# Patient Record
Sex: FEMALE | Race: WHITE
[De-identification: names, ages, dates, MRNs, and addresses within clinical notes are randomized per-mention and may not be internally consistent; named-entity substitution may affect disease eponyms.]

---

## 2017-02-08 ENCOUNTER — HOSPITAL ENCOUNTER (EMERGENCY)
Dept: HOSPITAL 45 - C.EDB | Age: 37
Discharge: HOME | End: 2017-02-08
Payer: COMMERCIAL

## 2017-02-08 VITALS — OXYGEN SATURATION: 100 % | HEART RATE: 95 BPM | SYSTOLIC BLOOD PRESSURE: 120 MMHG | DIASTOLIC BLOOD PRESSURE: 80 MMHG

## 2017-02-08 VITALS
HEIGHT: 62.01 IN | HEIGHT: 62.01 IN | BODY MASS INDEX: 25.28 KG/M2 | BODY MASS INDEX: 25.28 KG/M2 | WEIGHT: 139.11 LBS | WEIGHT: 139.11 LBS

## 2017-02-08 VITALS — TEMPERATURE: 97.88 F

## 2017-02-08 DIAGNOSIS — O20.0: Primary | ICD-10-CM

## 2017-02-08 DIAGNOSIS — Z3A.08: ICD-10-CM

## 2017-02-08 NOTE — EMERGENCY ROOM VISIT NOTE
History


Report prepared by Sincere:  Catherine Hart


Under the Supervision of:  Dr. Yusuf Walls D.O.


First contact with patient:  11:39


Chief Complaint:  ED VAG BLEEDING


Stated Complaint:  POSSIBLE MISCARRIAGE





History of Present Illness


The patient is a 36 year old female who presents to the Emergency Room with 

complaints of intermittent vaginal bleeding starting a few days ago. The 

patient reports some spotting. She denies any abdominal pain/cramping or heavy 

bleeding. She had taken a few urine pregnancy tests a few day ago which were 

positive. Since she started having spotting, she took 2 urine pregnancy tests 

which have been negative. Her last pregnancy test was this morning. The patient 

had nausea and vomiting last week. She denies any nausea or vomiting this week. 

The patient denies chest pain, shortness of breath, or any other complaints. 

She thinks she is 8 weeks pregnant. This is her second pregnancy. She has 1 

child. Her blood type is O positive. She has an appointment with her OB-GYN on 

February 13.





   Source of History:  patient


   Onset:  a few days ago


   Position:  other (global)


   Quality:  other (vaginal bleeding)


   Timing:  intermittent


   Associated Symptoms:  No SOB, No abdominal pain, No chest pain, No nausea, 

No vomiting





Review of Systems


See HPI for pertinent positives & negatives. A total of 10 systems reviewed and 

were otherwise negative.





Past Medical & Surgical


Medical Problems:


(1) Pregnancy








Family History





Patient reports no known family medical history.





Social History


Smoking Status:  Never Smoker


Marital Status:  


Housing Status:  lives with family





Current/Historical Medications


Scheduled


Multivit/Min/Iron/Fol Ac/Pren (Prenatal Vitamin), 1 TAB PO DAILY





Allergies


Coded Allergies:  


     No Known Allergies (Unverified , 2/8/17)





Physical Exam


Vital Signs











  Date Time  Temp Pulse Resp B/P Pulse Ox O2 Delivery O2 Flow Rate FiO2


 


2/8/17 12:23  95 16 120/80 100   


 


2/8/17 11:18 36.6 92 18 129/82 100   











Physical Exam


GENERAL:  Patient is awake, alert, somewhat anxious appearing but comfortable, 

does not appear to be in any pain. 


EYES: The conjunctivae are clear.  The pupils are round and reactive. 


EARS, NOSE, MOUTH AND THROAT: The nose is without any evidence of any 

deformity. Mucous membranes are moist tongue is midline 


NECK: The neck is nontender and supple.


RESPIRATORY: Normal respiratory effort is noted there is no evidence of 

wheezing rhonchi or rales


CARDIOVASCULAR:  Regular rate and rhythm noted there no murmurs rubs or gallops 

normal S1 normal S2 


GASTROINTESTINAL: The abdomen is soft. Bowel sounds are present in all 

quadrants. Abdomen is nontender


MUSCULOSKELETAL/EXTREMITIES: There is no evidence of gross deformity full range 

of motion is noted in the hips and shoulders


SKIN: There is no obvious evidence of any rash. There are no petechiae, pallor 

or cyanosis noted. 


NEUROLOGIC:  Patient is awake alert and oriented x3





Medical Decision & Procedures


Procedure


Bedside ultrasound was done. There was a gestational sac noted in the uterus 

directly midline. Fetal heart rate was noted visually as well as by Doppler.





ED Course


1139: The patient was evaluated in room C07. A complete history and physical 

examination were performed. 





1216: Upon reevaluation, the patient is resting comfortably. I discussed the 

results and treatment plan with her. She verbalized agreement of the treatment 

plan. She was discharged home.





Medical Decision


Prior records/ancillary studies reviewed.


Triage Nursing notes reviewed.





Differential diagnosis:


Etiologies such as ectopic pregnancy, dysfunction uterine bleeding, bleeding 

dyscrasia, trauma, infection, as well as others were entertained.





The patient is a 36-year-old female who presented to the emergency department 

for an evaluation of possible miscarriage. The patient had vaginal spotting and 

states that she was having some cramping. She took a home pregnancy test 

recently which was positive but then took a home pregnancy test that she states 

was negative. The patient was very concerned that she was having a miscarriage 

and was very tearful and emotional. Bedside ultrasound did reveal an 

intrauterine gestational sac with a fetal heart rate that was noted on 

visualization as well as Doppler. The patient was very happy to see this. I 

offered to do further laboratory and radiographic studies such as a formal 

ultrasound and beta hCG but the patient did not wish to have that at this time. 

She states that she will follow-up with her primary OB/GYN physician. I 

discussed with her the possibility of an ectopic pregnancy as well. She was 

encouraged to follow-up with her OB/GYN physician as soon as possible return to 

the emergency Department immediately if she develops any symptoms which could 

be consistent with ectopic such as dizziness upon standing heavy bleeding 

severe pain severe nausea vomiting or if need arises.





Impression





 Primary Impression:  


 Threatened miscarriage





Scribe Attestation


The scribe's documentation has been prepared under my direction and personally 

reviewed by me in its entirety. I confirm that the note above accurately 

reflects all work, treatment, procedures, and medical decision making performed 

by me.





Departure Information


Dispostion


Home / Self-Care





Referrals


No Doctor, Assigned (PCP)








Jennifer Galvan M.D.





Forms


HOME CARE DOCUMENTATION FORM,                                                 

               IMPORTANT VISIT INFORMATION, WORK / SCHOOL INSTRUCTIONS





Patient Instructions


ED Miscarriage Poss, [facility]





Additional Instructions





Follow-up with your OB/GYN physician as scheduled. Rest and avoid any strenuous 

activity. Return to the emergency department if you develop worrisome symptoms 

of ectopic pregnancy such as severe bleeding severe pain dizziness upon 

standing or if any other worrisome symptoms develop.

## 2017-02-13 ENCOUNTER — HOSPITAL ENCOUNTER (OUTPATIENT)
Dept: HOSPITAL 45 - C.LABSPEC | Age: 37
Discharge: HOME | End: 2017-02-13
Attending: OBSTETRICS & GYNECOLOGY
Payer: COMMERCIAL

## 2017-02-13 DIAGNOSIS — Z34.90: Primary | ICD-10-CM

## 2017-02-13 LAB
APPEARANCE UR: CLEAR
BILIRUB UR-MCNC: (no result) MG/DL
COLOR UR: YELLOW
MANUAL MICROSCOPIC REQUIRED?: NO
NITRITE UR QL STRIP: (no result)
PH UR STRIP: 5.5 [PH] (ref 4.5–7.5)
REVIEW REQ?: NO
SP GR UR STRIP: 1.02 (ref 1–1.03)
URINE BILL WITH OR WITHOUT MIC: (no result)
UROBILINOGEN UR-MCNC: (no result) MG/DL

## 2017-02-17 ENCOUNTER — HOSPITAL ENCOUNTER (OUTPATIENT)
Dept: HOSPITAL 45 - C.PAPS | Age: 37
Discharge: HOME | End: 2017-02-17
Attending: OBSTETRICS & GYNECOLOGY
Payer: COMMERCIAL

## 2017-02-17 ENCOUNTER — HOSPITAL ENCOUNTER (OUTPATIENT)
Dept: HOSPITAL 45 - C.LAB1850 | Age: 37
Discharge: HOME | End: 2017-02-17
Attending: OBSTETRICS & GYNECOLOGY
Payer: COMMERCIAL

## 2017-02-17 DIAGNOSIS — Z34.90: Primary | ICD-10-CM

## 2017-02-17 LAB
BASOPHILS # BLD: 0.05 K/UL (ref 0–0.2)
BASOPHILS NFR BLD: 0.8 %
COMPLETE: YES
EOSINOPHIL NFR BLD AUTO: 368 K/UL (ref 130–400)
HCT VFR BLD CALC: 37.5 % (ref 37–47)
IG%: 0.3 %
IMM GRANULOCYTES NFR BLD AUTO: 27.5 %
LYMPHOCYTES # BLD: 1.8 K/UL (ref 1.2–3.4)
MCH RBC QN AUTO: 30.9 PG (ref 25–34)
MCHC RBC AUTO-ENTMCNC: 34.7 G/DL (ref 32–36)
MCV RBC AUTO: 89.1 FL (ref 80–100)
MONOCYTES NFR BLD: 8.4 %
NEUTROPHILS # BLD AUTO: 7 %
NEUTROPHILS NFR BLD AUTO: 56 %
PMV BLD AUTO: 9.9 FL (ref 7.4–10.4)
RBC # BLD AUTO: 4.21 M/UL (ref 4.2–5.4)
WBC # BLD AUTO: 6.54 K/UL (ref 4.8–10.8)

## 2017-02-21 LAB
CHLAMYDIA TRACH RNA***: NOT DETECTED
GC (NEIS GONORRHOEAE)RNA**: NOT DETECTED

## 2017-04-14 ENCOUNTER — HOSPITAL ENCOUNTER (OUTPATIENT)
Dept: HOSPITAL 45 - C.LAB1850 | Age: 37
Discharge: HOME | End: 2017-04-14
Attending: OBSTETRICS & GYNECOLOGY
Payer: COMMERCIAL

## 2017-04-14 DIAGNOSIS — O09.522: Primary | ICD-10-CM

## 2017-04-14 LAB — GTGD: 50 GRAMS

## 2017-04-17 LAB
AFP CONCENTRATION: 35.2 NG/ML
AFP MSS INTERPRETATION: (no result)
AFP MULTIPLE OF MEDIAN: 1.04
AFPTS COMMENT: (no result)
AFPTS GESTATIONAL AGE: 16.3 WEEKS
AFPTS INSULIN DEP DIABETIC?: NO
AFPTS MATERNAL WT: 150 LBS
ALPHA-FETOPROTEIN RACE: (no result)
EDD DETERMINED BY: (no result)
HISTORY OF NTD: NO
REPEAT SAMPLE?: NO

## 2017-07-10 ENCOUNTER — HOSPITAL ENCOUNTER (OUTPATIENT)
Dept: HOSPITAL 45 - C.LAB1850 | Age: 37
Discharge: HOME | End: 2017-07-10
Attending: OBSTETRICS & GYNECOLOGY
Payer: COMMERCIAL

## 2017-07-10 DIAGNOSIS — O09.523: Primary | ICD-10-CM

## 2017-07-10 LAB
APPEARANCE UR: CLEAR
BILIRUB UR-MCNC: (no result) MG/DL
COLOR UR: YELLOW
GTGD: 50 GRAMS
HCT VFR BLD CALC: 33.8 % (ref 37–47)
MANUAL MICROSCOPIC REQUIRED?: NO
NITRITE UR QL STRIP: (no result)
PH UR STRIP: 7 [PH] (ref 4.5–7.5)
REVIEW REQ?: NO
SP GR UR STRIP: 1.02 (ref 1–1.03)
URINE EPITHELIAL CELL AUTO: >30 /LPF (ref 0–5)
UROBILINOGEN UR-MCNC: (no result) MG/DL

## 2017-08-10 ENCOUNTER — HOSPITAL ENCOUNTER (OUTPATIENT)
Dept: HOSPITAL 45 - C.LAB1850 | Age: 37
Discharge: HOME | End: 2017-08-10
Attending: OBSTETRICS & GYNECOLOGY
Payer: COMMERCIAL

## 2017-08-10 DIAGNOSIS — O12.10: Primary | ICD-10-CM

## 2017-08-10 LAB
COLLECT DURATION TIME UR: 24 HOURS
PROT UR STRIP-MCNC: 7.8 MG/DL (ref 0–11.9)
PROT UR STRIP.AUTO-MCNC: 210.6 MG/24 HR (ref 0–149.1)

## 2017-08-31 ENCOUNTER — HOSPITAL ENCOUNTER (OUTPATIENT)
Dept: HOSPITAL 45 - C.LAB1850 | Age: 37
Discharge: HOME | End: 2017-08-31
Attending: OBSTETRICS & GYNECOLOGY
Payer: COMMERCIAL

## 2017-08-31 DIAGNOSIS — Z11.3: Primary | ICD-10-CM

## 2017-09-04 LAB
CHLAMYDIA TRACH RNA***: NOT DETECTED
GC (NEIS GONORRHOEAE)RNA**: NOT DETECTED

## 2017-09-06 ENCOUNTER — HOSPITAL ENCOUNTER (OUTPATIENT)
Dept: HOSPITAL 45 - C.LABSPEC | Age: 37
Discharge: HOME | End: 2017-09-06
Attending: OBSTETRICS & GYNECOLOGY
Payer: COMMERCIAL

## 2017-09-06 DIAGNOSIS — Z3A.00: ICD-10-CM

## 2017-09-06 DIAGNOSIS — O09.523: Primary | ICD-10-CM

## 2017-09-26 ENCOUNTER — HOSPITAL ENCOUNTER (OUTPATIENT)
Dept: HOSPITAL 45 - C.LABSPEC | Age: 37
Discharge: HOME | End: 2017-09-26
Attending: OBSTETRICS & GYNECOLOGY
Payer: COMMERCIAL

## 2017-09-26 DIAGNOSIS — O09.523: Primary | ICD-10-CM

## 2017-09-27 ENCOUNTER — HOSPITAL ENCOUNTER (INPATIENT)
Dept: HOSPITAL 45 - C.LD | Age: 37
LOS: 8 days | Discharge: HOME | End: 2017-10-05
Attending: OBSTETRICS & GYNECOLOGY | Admitting: OBSTETRICS & GYNECOLOGY
Payer: COMMERCIAL

## 2017-09-27 VITALS
BODY MASS INDEX: 35.32 KG/M2 | HEIGHT: 62.01 IN | WEIGHT: 194.4 LBS | WEIGHT: 194.4 LBS | BODY MASS INDEX: 35.32 KG/M2 | HEIGHT: 62.01 IN

## 2017-09-27 DIAGNOSIS — O48.0: Primary | ICD-10-CM

## 2017-09-27 DIAGNOSIS — Z3A.41: ICD-10-CM

## 2017-10-04 VITALS
SYSTOLIC BLOOD PRESSURE: 131 MMHG | OXYGEN SATURATION: 99 % | HEART RATE: 89 BPM | DIASTOLIC BLOOD PRESSURE: 79 MMHG | TEMPERATURE: 98.42 F

## 2017-10-04 VITALS — TEMPERATURE: 97.88 F | DIASTOLIC BLOOD PRESSURE: 65 MMHG | HEART RATE: 85 BPM | SYSTOLIC BLOOD PRESSURE: 109 MMHG

## 2017-10-04 VITALS — HEART RATE: 94 BPM | SYSTOLIC BLOOD PRESSURE: 123 MMHG | TEMPERATURE: 98.06 F | DIASTOLIC BLOOD PRESSURE: 83 MMHG

## 2017-10-04 VITALS
OXYGEN SATURATION: 98 % | HEART RATE: 74 BPM | SYSTOLIC BLOOD PRESSURE: 114 MMHG | DIASTOLIC BLOOD PRESSURE: 76 MMHG | TEMPERATURE: 98.06 F

## 2017-10-04 LAB
EOSINOPHIL NFR BLD AUTO: 302 K/UL (ref 130–400)
HCT VFR BLD CALC: 35.6 % (ref 37–47)
MCH RBC QN AUTO: 32.7 PG (ref 25–34)
MCHC RBC AUTO-ENTMCNC: 35.4 G/DL (ref 32–36)
MCV RBC AUTO: 92.5 FL (ref 80–100)
PMV BLD AUTO: 10.6 FL (ref 7.4–10.4)
RBC # BLD AUTO: 3.85 M/UL (ref 4.2–5.4)
WBC # BLD AUTO: 12.65 K/UL (ref 4.8–10.8)

## 2017-10-04 PROCEDURE — 0KQM0ZZ REPAIR PERINEUM MUSCLE, OPEN APPROACH: ICD-10-PCS | Performed by: OBSTETRICS & GYNECOLOGY

## 2017-10-04 RX ADMIN — DOCUSATE SODIUM SCH MG: 100 CAPSULE, LIQUID FILLED ORAL at 19:11

## 2017-10-04 RX ADMIN — Medication PRN MG: at 19:12

## 2017-10-04 RX ADMIN — BENZOCAINE AND LEVOMENTHOL PRN APPLN: 200; 5 SPRAY TOPICAL at 13:08

## 2017-10-04 RX ADMIN — Medication PRN MG: at 23:43

## 2017-10-04 RX ADMIN — Medication PRN MG: at 10:52

## 2017-10-04 NOTE — DELIVERY SUMMARY
DATE OF OPERATION:  10/04/2017

 

FINDINGS:  Viable male infant with Apgars of 8 and 9.  Baby delivered

spontaneously over a midline second-degree laceration.  Cord gases obtained. 

Cord blood samples obtained.  Placenta delivered spontaneously.  Laceration

repaired with 4-0 Vicryl in routine fashion.  Her estimated blood loss was

300 cc.

 

LABOR NOTE:  The patient is a 37-year-old  2, para 1, with an EDC of

 at 41 weeks gestational age, who presented in active labor. 

The patient states that contractions began late last evening and increased in

intensity.  She had been scheduled for an induction this a.m. for post dates.

 The patient has had a benign prenatal course.  She had genetics evaluation

for advanced maternal age which was re-assuring.  Upon admission, the patient

was 6 cm dilated, 100% effaced and -2 station.  Tracing was category 1. 

Contractions were regular.  The patient declined any anesthesia.  Delivering

physician assumed care for the patient with artificial rupture of membranes

of clear fluid.  Over the next 2 hours, the patient progressed to full

dilatation and began her second stage.  She pushed for approximately 15

minutes over a midline second-degree laceration.  Cord was clamped and cut. 

Cord gas samples were obtained.  Placenta was delivered spontaneously. 

Midline laceration was repaired with 4-0 Vicryl in a routine fashion. 

Estimated blood loss was 300 cc.  Sponge and needle count was correct.

 

 

I attest to the content of the Intraoperative Record and any orders documented therein. Any exception
s are noted below.

## 2017-10-05 VITALS — DIASTOLIC BLOOD PRESSURE: 78 MMHG | HEART RATE: 90 BPM | SYSTOLIC BLOOD PRESSURE: 115 MMHG | TEMPERATURE: 98.42 F

## 2017-10-05 VITALS — HEART RATE: 90 BPM | DIASTOLIC BLOOD PRESSURE: 78 MMHG | TEMPERATURE: 98.42 F

## 2017-10-05 VITALS — SYSTOLIC BLOOD PRESSURE: 107 MMHG | HEART RATE: 84 BPM | DIASTOLIC BLOOD PRESSURE: 70 MMHG | TEMPERATURE: 98.24 F

## 2017-10-05 VITALS — SYSTOLIC BLOOD PRESSURE: 106 MMHG | HEART RATE: 82 BPM | TEMPERATURE: 98.24 F | DIASTOLIC BLOOD PRESSURE: 67 MMHG

## 2017-10-05 LAB — HCT VFR BLD CALC: 31.8 % (ref 37–47)

## 2017-10-05 RX ADMIN — DOCUSATE SODIUM SCH MG: 100 CAPSULE, LIQUID FILLED ORAL at 07:54

## 2017-10-05 RX ADMIN — Medication PRN MG: at 04:21

## 2017-10-05 RX ADMIN — BENZOCAINE AND LEVOMENTHOL PRN APPLN: 200; 5 SPRAY TOPICAL at 17:30

## 2017-10-05 RX ADMIN — Medication PRN MG: at 12:06

## 2017-10-05 RX ADMIN — Medication PRN MG: at 16:58

## 2017-10-05 RX ADMIN — Medication PRN MG: at 07:54

## 2017-10-05 NOTE — DISCHARGE INSTRUCTIONS
Discharge Instructions


Date of Service


Oct 5, 2017.





Admission


Reason for Admission:  IUP





Discharge


Discharge Diagnosis / Problem:  Recovery from vaginal delivery





Discharge Goals


Goal(s):  Routine recovery after delivery





Medications


Continue Dispensed Medications:  supercream, dermaplast, tucks, lansinoh





Activity Recommendations


Activity Limitations:  per Instructions/Follow-up section





.





Instructions / Follow-Up


Instructions / Follow-Up





ACTIVITY RECOMMENDATIONS:





* Gradual return to full activity over the next 2-3 weeks.


* No lifting - nothing heavier than baby over the next 2-3 weeks.


* Do not engage in vigorous exercise, sexual activity or sports until cleared by


   your physician.


* Do not drive or operate any motorized equipment until cleared by your 

physician.


* You may shower/bathe daily.








MEDICATIONS:





For discomfort or pain, you may use Acetaminophen (Tylenol), Ibuprofen (Advil),


or Naproxen (Aleve) following the package directions. For constipation you may 


use Colace following the package directions.








BREAST CARE:





If you are not breast feeding:





*  Wear a supportive bra 24 hours a day for one to two weeks.


*  Avoid stimulating your breasts and nipples as much as possible during the 

first 


    few weeks after delivery.


*  When taking a shower, have the warm water hit your back, not breasts.


*  When your breasts feel full, apply ice packs.  Usually three to four times a 

day


    helps ease the discomfort.


*  Take a mild pain medication (Tylenol / Motrin) when you are uncomfortable.





If breast feeding:





*  Use breast milk to lubricate nipples.  Lansinoh cream may be used for sore 

nipples. 


    You do not need to remove cream prior to breast feeding.  If using a 

different brand


   of cream, check the label for directions regarding removal of cream prior to 

nursing.


*  Wear a supportive bra.


*  If having problems with breasts or breast feeding, call a lactation 

consultant 


    or your health care provider.








EPISIOTOMY CARE:





After delivery, if you have an episiotomy (stitches), the following steps will 

ease


discomfort and aid healing.





*  For the first 24 hours after delivery, place ice packs next to your 

episiotomy to


   help reduce swelling.


*  After the first 24 hour-period, sitz baths, either portable or in the tub, 

are suggested.


    A shower with a shower arm sprayed over the episiotomy may be comforting.


*  [place] care should be done after each voiding and bowel movement.  Squirt warm 

water


    from a plastic bottle over the perineum (region of the body between the 

anus and 


    urinary opening) and pat dry.


*  Use Dermoplast to ease discomfort.  Shake container.  Spray directly over 

the 


    episiotomy.  Place a Tucks on a clean sanitary pad next to your episiotomy.








SPECIAL CARE INSTRUCTIONS:





When you are discharged from the hospital, it is important for you to follow 

the instructions 


listed below:





*  During the first week at home, you should be able to care for yourself and 

your baby.


    In addition, the usual light household activities are encouraged.





*  Limit your activities to the way you feel.  Do not try to clean the house or 

move 


    furniture. Be sensible.





*  If you actively engage in sports and have done so up until the time of your 

delivery, 


    you may resume these activities as soon as you feel able.  This may take up 

to one 


    month or even longer.  Use good judgment.





*  Continue to take your prenatal vitamins for at least six weeks after the 

birth of your baby.





*  Your diet need not be limited unless you were on a special diet before your 

delivery.  


    Breast-feeding mothers need around 2500 calories per day and at least 64-80 

ounces of 


    fluid per day (8 to 10 glasses).





*  You should eat foods from the four major food groups.  Crash diets or fad 

diets are to be 


    avoided.  Eating lean meats, fresh fruits and vegetables, low-fat dairy 

products, high fiber


    foods and a regular exercise program, will help you get back to your pre-

pregnancy weight


    without putting your health at risk.





*  Constipation is sometimes a problem after delivery.  Take a mild laxative as 

needed.  If 


    breast feeding, Milk of Magnesia is acceptable to use. You may use a 

suppository or Fleets


    enema if no episiotomy.





*  A daily shower or tub bath is suggested.  Be sure to thoroughly and gently 

dry the perineum.





*  A bloody vaginal discharge will usually continue until around four weeks 

post partum.  A 


    small amount of bleeding may continue for as long as six weeks.  Vaginal 

discharge changes


    from the bright red bleeding after delivery to pink then brownish and 

finally yellowish-pink 


    before becoming white and disappearing.





*  Bleeding may increase with activity.  Your first period may come in 4-8 

weeks.  If you are 


    breast feeding, your period may be delayed even longer.





*  [place] (sex) can begin whenever both you and your partner feel 

comfortable and do 


    not have any form of genital infection.  It is recommended that you wait at 

least six weeks


    for internal and external healing to occur.  If you have questions, please 

talk to your health


    care practitioner.  A condom should be used to prevent infection and 

pregnancy.





*  Foreplay, gentle intercourse and lubrication is very important the first 

several times to 


    prevent pain.  A water-based lubricant such as K-Y jelly or Astroglide may 

be used.





*  If you have RH negative blood and your baby is RH positive, you will receive 

RHOGAM by 


    injection prior to discharge.  The nurse will give you a card to keep with 

you that has the


    date and place that you received RHOGAM after delivery.





*  During your prenatal care, you had a Rubella screen done to check for the 

presence of 


    rubella antibodies in your blood.  If your test was negative, you will 

receive a Rubella 


    vaccine prior to discharge.  This vaccine may cause a fever, soreness at 

the injection site


    and flu-like symptoms.  If these symptoms persist, notify your health care 

practitioner.  


    Pregnancy is not advised for one month after a Rubella vaccine.





*  Verbalizes understanding of car seat law as reviewed with patient nursing.





*  Car Seat hand-out given and reviewed with patient by nursing.





*  Shaken baby information reviewed with patient by nursing.


 


Call you doctor if:





*  Heavy bleeding (saturating several pads an hour) or passing clots the size 

of your fist.


*  A fever >101 degrees F (38.3 degrees C) on two occasions four hours apart and

/or chills.


*  Unusual pain in the pelvic or vaginal areas.


* "Baby Blues" lasting longer than two weeks.





If you have any questions or concerns, call your health care practitioner at 


[phone].








FOLLOW UP VISIT:





*  Please call the office at [phone] to schedule a 6 week postpartum 


    examination.  It is important you keep this appointment.  It is important 


    for you to make arrangements for either yearly or twice yearly check-ups 


    thereafter.





Current Hospital Diet


Patient's current hospital diet: Regular OB Diet





Discharge Diet


Recommended Diet:  Regular OB Diet





Pending Studies


Studies pending at discharge:  no





Medical Emergencies








.


Who to Call and When:





Medical Emergencies:  If at any time you feel your situation is an emergency, 

please call [phone] immediately.





.





Non-Emergent Contact


Non-Emergency issues call your:  Gynecologist





.


.








"Provider Documentation" section prepared by Santiago Negron.








.





VTE Core Measure


Inpt VTE Proph given/why not?:  Treatment not indicated

## 2018-05-11 ENCOUNTER — HOSPITAL ENCOUNTER (OUTPATIENT)
Dept: HOSPITAL 45 - C.LABBC | Age: 38
Discharge: HOME | End: 2018-05-11
Attending: FAMILY MEDICINE
Payer: COMMERCIAL

## 2018-05-11 DIAGNOSIS — R53.83: ICD-10-CM

## 2018-05-11 DIAGNOSIS — D64.9: Primary | ICD-10-CM

## 2018-05-11 LAB
BASOPHILS # BLD: 0.04 K/UL (ref 0–0.2)
BASOPHILS NFR BLD: 0.6 %
EOS ABS #: 0.1 K/UL (ref 0–0.5)
EOSINOPHIL NFR BLD AUTO: 396 K/UL (ref 130–400)
HCT VFR BLD CALC: 41.5 % (ref 37–47)
HGB BLD-MCNC: 13.9 G/DL (ref 12–16)
IG#: 0.03 K/UL (ref 0–0.02)
IMM GRANULOCYTES NFR BLD AUTO: 22.5 %
LYMPHOCYTES # BLD: 1.55 K/UL (ref 1.2–3.4)
MCH RBC QN AUTO: 30.8 PG (ref 25–34)
MCHC RBC AUTO-ENTMCNC: 33.5 G/DL (ref 32–36)
MCV RBC AUTO: 92 FL (ref 80–100)
MONO ABS #: 0.55 K/UL (ref 0.11–0.59)
MONOCYTES NFR BLD: 8 %
NEUT ABS #: 4.61 K/UL (ref 1.4–6.5)
NEUTROPHILS # BLD AUTO: 1.5 %
NEUTROPHILS NFR BLD AUTO: 67 %
PMV BLD AUTO: 10.2 FL (ref 7.4–10.4)
RED CELL DISTRIBUTION WIDTH CV: 13.8 % (ref 11.5–14.5)
RED CELL DISTRIBUTION WIDTH SD: 46.4 FL (ref 36.4–46.3)
WBC # BLD AUTO: 6.88 K/UL (ref 4.8–10.8)